# Patient Record
Sex: MALE | Employment: FULL TIME | URBAN - METROPOLITAN AREA
[De-identification: names, ages, dates, MRNs, and addresses within clinical notes are randomized per-mention and may not be internally consistent; named-entity substitution may affect disease eponyms.]

---

## 2022-06-23 ENCOUNTER — TRANSCRIBE ORDER (OUTPATIENT)
Dept: SCHEDULING | Age: 52
End: 2022-06-23

## 2022-06-23 DIAGNOSIS — Z13.6 ENCOUNTER FOR SPECIAL SCREENING EXAMINATION FOR CARDIOVASCULAR DISORDER: Primary | ICD-10-CM

## 2022-07-07 ENCOUNTER — HOSPITAL ENCOUNTER (OUTPATIENT)
Dept: CT IMAGING | Age: 52
Discharge: HOME | End: 2022-07-07
Attending: INTERNAL MEDICINE
Payer: SELF-PAY

## 2022-07-07 DIAGNOSIS — Z13.6 ENCOUNTER FOR SPECIAL SCREENING EXAMINATION FOR CARDIOVASCULAR DISORDER: ICD-10-CM

## 2022-07-07 PROCEDURE — 75571 CT HRT W/O DYE W/CA TEST: CPT

## 2022-07-22 ENCOUNTER — OFFICE VISIT (OUTPATIENT)
Dept: CARDIOLOGY CLINIC | Age: 52
End: 2022-07-22
Payer: COMMERCIAL

## 2022-07-22 VITALS
BODY MASS INDEX: 30.31 KG/M2 | SYSTOLIC BLOOD PRESSURE: 132 MMHG | OXYGEN SATURATION: 98 % | HEIGHT: 68 IN | HEART RATE: 64 BPM | WEIGHT: 200 LBS | DIASTOLIC BLOOD PRESSURE: 80 MMHG

## 2022-07-22 DIAGNOSIS — R93.1 ELEVATED CORONARY ARTERY CALCIUM SCORE: Primary | ICD-10-CM

## 2022-07-22 PROCEDURE — 93000 ELECTROCARDIOGRAM COMPLETE: CPT | Performed by: INTERNAL MEDICINE

## 2022-07-22 PROCEDURE — 99203 OFFICE O/P NEW LOW 30 MIN: CPT | Performed by: INTERNAL MEDICINE

## 2022-07-22 RX ORDER — LISINOPRIL 2.5 MG/1
10 TABLET ORAL DAILY
COMMUNITY
Start: 2022-04-30

## 2022-07-22 RX ORDER — LEVOTHYROXINE SODIUM 50 UG/1
TABLET ORAL
COMMUNITY
Start: 2022-04-28

## 2022-07-22 RX ORDER — PITAVASTATIN CALCIUM 4.18 MG/1
4 TABLET, FILM COATED ORAL DAILY
COMMUNITY
Start: 2022-04-30 | End: 2022-07-22 | Stop reason: ALTCHOICE

## 2022-07-22 RX ORDER — ROSUVASTATIN CALCIUM 10 MG/1
10 TABLET, COATED ORAL
Qty: 30 TABLET | Refills: 6 | Status: SHIPPED | OUTPATIENT
Start: 2022-07-22

## 2022-07-22 NOTE — LETTER
7/22/2022    Patient: Jerrell Rangel   YOB: 1970   Date of Visit: 7/22/2022     Neeraj Matt, [address] [facility] [address]  [place] [address]  Via Fax: [phone], [facility] [place]  [place] [address]  Via Fax: [phone]    Dear ADELSO Diams PA,      Thank you for referring Mr. Paris Marion to [address] [place] [address] for evaluation. My notes for this consultation are attached. If you have questions, please do not hesitate to call me. I look forward to following your patient along with you.       Sincerely,    Lyle Ruiz MD

## 2022-07-22 NOTE — PROGRESS NOTES
[facility]    Mr. Aundrea Lynn is 57-year-old male with a history of hypertension and hyperlipidemia    Patient is here today for cardiac evaluation. He denies any prior history of MI or CHF. Patient recently had a calcium coronary is score is a screening. Score was elevated so he was asked to come see me. He denies any chest pain or chest tightness concerning for angina. He does have random episode of shortness of breath however does not appear to be exertional.  He is able to perform activity daily living without any limitation. He denies presyncope or syncope. No palpitation. Denies any nausea, vomiting, abdominal pain, fever, chills, sputum production. No hematuria or other bleeding complaints    Past Medical History:   Diagnosis Date    Essential hypertension     Hyperchloremia     Hypertension     Kidney stone        Review of Systems:  Cardiac symptoms as noted above in HPI. All others negative. Denies fatigue, malaise, skin rash, joint pain, blurring vision, photophobia, neck pain, hemoptysis, chronic cough, nausea, vomiting, hematuria, burning micturition, BRBPR, chronic headaches. Current Outpatient Medications   Medication Sig    levothyroxine (SYNTHROID) 50 mcg tablet TAKE 1 TABLET BY MOUTH EVERY DAY ON EMPTY STOMACH 30 MIN BEFORE FOOD    lisinopriL (PRINIVIL, ZESTRIL) 2.5 mg tablet Take 10 mg by mouth in the morning. No current facility-administered medications for this visit. No past surgical history on file. Allergies and Sensitivities:  No Known Allergies    Family History:  No family history on file. Social History:  Social History     Tobacco Use    Smoking status: Never    Smokeless tobacco: Never   Substance Use Topics    Alcohol use: Yes     Alcohol/week: 0.0 standard drinks     Types: 12 - 14 Cans of beer per week    Drug use: No     He  reports that he has never smoked.  He has never used smokeless tobacco.  He  reports current alcohol use. Physical Exam:  BP Readings from Last 3 Encounters:   22 132/80   13 120/70   13 118/68         Pulse Readings from Last 3 Encounters:   22 64   13 72   13 70          Wt Readings from Last 3 Encounters:   22 90.7 kg (200 lb)   13 81.6 kg (180 lb)   13 81.6 kg (180 lb)       Constitutional: Oriented to person, place, and time. HENT: Head: Normocephalic and atraumatic. Eyes: Conjunctivae and extraocular motions are normal.   Neck: No JVD present. Carotid bruit is not appreciated. Cardiovascular: Regular rhythm. No murmur, gallop or rubs appreciated  Lung: Breath sounds normal. No respiratory distress. No ronchi or rales appreciated  Abdominal: No tenderness. No rebound and no guarding. Musculoskeletal: There is no lower extremity edema. No cynosis  Lymphadenopathy:  No cervical or supraclavicular adenopathy appriciated. Neurological: No gross motor deficit noted. Skin: No visible skin rash noted. No Ear discharge noted  Psychiatric: Normal mood and affect. LABS:   @  Lab Results   Component Value Date/Time    WBC 10.3 2013 04:20 PM    HGB 13.3 2013 04:20 PM    HCT 39.1 2013 04:20 PM    PLATELET 309  04:20 PM    MCV 88.3 2013 04:20 PM     Lab Results   Component Value Date/Time    Sodium 143 2013 04:20 PM    Potassium 3.9 2013 04:20 PM    Chloride 103 2013 04:20 PM    CO2 31 2013 04:20 PM    Glucose 112 (H) 2013 04:20 PM    BUN 19 (H) 2013 04:20 PM    Creatinine 1.20 2013 04:20 PM     No flowsheet data found. Lab Results   Component Value Date/Time    ALT (SGPT) 34 2013 04:20 PM     No results found for: HBA1C, WYB7PDPY, YMB7FBAT  No results found for: TSH, TSH2, TSH3, TSHP, TSHEXT    EK2022: Sinus rhythm at 64 bpm.  Normal VT and QRS interval.  No ST-T changes of ischemia.     STRESS TEST (EST, PHARM, NUC, ECHO etc)    CATHETERIZATION    IMPRESSION & PLAN:  Mr. David Felix is 47-year-old male    Hypertension:  /80 mmHg. Currently on lisinopril 10 mg daily. Will order echo to rule out hypertensive cardiovascular heart disease  Salt restriction, dietary modification, weight loss and other nonpharmacologic intervention discussed    Hyperlipidemia: This is being managed by PCP  Currently on Livalo 1 mg daily. Last lipid profile on 7/18/2022 showed total cholesterol 186, HDL 38,   Considering elevated calcium coronary score, I am going to try to discontinue Livalo and try again Crestor 10 mg daily. Patient had a myalgia and joint discomfort with use of atorvastatin and Crestor very long time ago. He is willing to try again. Abnormal coronary calcium score:  Patient had a CT calcium scoring which showed score of 98, LAD 63 and RCA 35  Currently without any angina. Random episode of dyspnea. We will check echo to rule out hypertensive cardiovascular disease  Will proceed with treadmill stress test to evaluate for any possible ischemic changes on EKG. Importance of diet and exercise was discussed with patient. This plan was discussed with patient who is in agreement. Thank you for allowing me to participate in patient care. Please feel free to call me if you have any question or concern. Diane Dawson MD  Please note: This document has been produced using voice recognition software. Unrecognized errors in transcription may be present.

## 2022-09-08 ENCOUNTER — TELEPHONE (OUTPATIENT)
Dept: CARDIOLOGY CLINIC | Age: 52
End: 2022-09-08

## 2023-01-11 RX ORDER — ROSUVASTATIN CALCIUM 10 MG/1
10 TABLET, COATED ORAL
Qty: 90 TABLET | Refills: 2 | Status: SHIPPED | OUTPATIENT
Start: 2023-01-11

## 2023-04-24 ENCOUNTER — OFFICE VISIT (OUTPATIENT)
Age: 53
End: 2023-04-24
Payer: COMMERCIAL

## 2023-04-24 ENCOUNTER — HOSPITAL ENCOUNTER (OUTPATIENT)
Facility: HOSPITAL | Age: 53
Discharge: HOME | End: 2023-04-27

## 2023-04-24 VITALS
HEIGHT: 68 IN | OXYGEN SATURATION: 98 % | WEIGHT: 204 LBS | HEART RATE: 61 BPM | SYSTOLIC BLOOD PRESSURE: 128 MMHG | BODY MASS INDEX: 30.92 KG/M2 | DIASTOLIC BLOOD PRESSURE: 74 MMHG

## 2023-04-24 DIAGNOSIS — R93.1 ABNORMAL FINDINGS ON DIAGNOSTIC IMAGING OF HEART AND CORONARY CIRCULATION: Primary | ICD-10-CM

## 2023-04-24 DIAGNOSIS — R93.1 ABNORMAL FINDINGS ON DIAGNOSTIC IMAGING OF HEART AND CORONARY CIRCULATION: ICD-10-CM

## 2023-04-24 LAB — SENTARA SPECIMEN COLLECTION: NORMAL

## 2023-04-24 PROCEDURE — 99001 SPECIMEN HANDLING PT-LAB: CPT

## 2023-04-24 PROCEDURE — 99213 OFFICE O/P EST LOW 20 MIN: CPT | Performed by: INTERNAL MEDICINE

## 2023-04-24 RX ORDER — LISINOPRIL 10 MG/1
10 TABLET ORAL DAILY
COMMUNITY
Start: 2023-03-01

## 2023-04-24 NOTE — PROGRESS NOTES
[facility]    Edwin Sanchez is 45 y.o. male with a history of hypertension and hyperlipidemia    Patient is here today for follow up. Denies any resting or exertional chest pain or chest pressure to suggest angina or any dyspnea to suggest heart failure. No presyncope or syncope  Denies any PND or LE edema. Taking all medications regularly. Denies any nausea, vomiting, abdominal pain, fever, chills, sputum production. No hematuria or other bleeding complaints      Past Medical History:   Diagnosis Date    Essential hypertension     Hyperchloremia     Hypertension     Kidney stone        Review of Systems:  Cardiac symptoms as noted above in HPI. All others negative. Denies fatigue, malaise, skin rash, joint pain, blurring vision, photophobia, neck pain, hemoptysis, chronic cough, nausea, vomiting, hematuria, burning micturition, BRBPR, chronic headaches. Current Outpatient Medications   Medication Sig    lisinopril (PRINIVIL;ZESTRIL) 10 MG tablet Take 1 tablet by mouth daily    levothyroxine (SYNTHROID) 50 MCG tablet TAKE 1 TABLET BY MOUTH EVERY DAY ON EMPTY STOMACH 30 MIN BEFORE FOOD    rosuvastatin (CRESTOR) 10 MG tablet Take 1 tablet by mouth     No current facility-administered medications for this visit. No past surgical history on file. Allergies and Sensitivities:  No Known Allergies    Family History:  No family history on file. Social History:  Social History     Tobacco Use    Smoking status: Never    Smokeless tobacco: Never   Substance Use Topics    Alcohol use: Yes     Alcohol/week: 0.0 standard drinks    Drug use: No     He  reports that he has never smoked. He has never used smokeless tobacco.  He  reports current alcohol use.     Physical Exam:  BP Readings from Last 3 Encounters:   04/24/23 128/74   09/09/22 132/80   07/22/22 132/80         Pulse Readings from Last 3 Encounters:   04/24/23 61

## 2023-04-25 LAB
CHOLESTEROL/HDL RATIO: 4 (ref 0–5)
CHOLESTEROL: 183 MG/DL (ref 110–200)
HDLC SERPL-MCNC: 46 MG/DL
LDL CHOLESTEROL CALCULATED: 104 MG/DL (ref 50–99)
LDL/HDL RATIO: 2.3
NON-HDL CHOLESTEROL: 137 MG/DL
TRIGL SERPL-MCNC: 163 MG/DL (ref 40–149)
VLDLC SERPL CALC-MCNC: 33 MG/DL (ref 8–30)

## 2024-04-29 ENCOUNTER — OFFICE VISIT (OUTPATIENT)
Age: 54
End: 2024-04-29
Payer: COMMERCIAL

## 2024-04-29 VITALS
DIASTOLIC BLOOD PRESSURE: 90 MMHG | WEIGHT: 204 LBS | SYSTOLIC BLOOD PRESSURE: 120 MMHG | OXYGEN SATURATION: 98 % | HEART RATE: 73 BPM | BODY MASS INDEX: 31.02 KG/M2

## 2024-04-29 DIAGNOSIS — E78.00 PURE HYPERCHOLESTEROLEMIA: ICD-10-CM

## 2024-04-29 DIAGNOSIS — I10 ESSENTIAL HYPERTENSION WITH GOAL BLOOD PRESSURE LESS THAN 140/90: Primary | ICD-10-CM

## 2024-04-29 PROCEDURE — 3080F DIAST BP >= 90 MM HG: CPT | Performed by: INTERNAL MEDICINE

## 2024-04-29 PROCEDURE — 3074F SYST BP LT 130 MM HG: CPT | Performed by: INTERNAL MEDICINE

## 2024-04-29 PROCEDURE — 99213 OFFICE O/P EST LOW 20 MIN: CPT | Performed by: INTERNAL MEDICINE

## 2024-04-29 NOTE — PROGRESS NOTES
heart rate recovery was normal. The patient reported no symptoms during the stress test.    IMPRESSION & PLAN:  Marcelo Topete  is 53 y.o. male with    Hypertension: /84.  mmHg.  Currently on lisinopril 10 mg daily.  Salt restriction, weight loss discussed    Hyperlipidemia:  This is being managed by PCP  Was on Livalo 1 mg daily.  Last lipid profile on 7/18/2022 showed total cholesterol  186, HDL 38,   Changed to crestor in 2022.   Last LDL in 04/2023 was 104.     Abnormal coronary calcium score:  Patient had a CT calcium scoring which showed score of 98, LAD 63 and RCA 35  Currently without any angina.   Echo and EST in 09/2022, low risk  Continue aggressive risk factor modification.     Currently he does not report any symptom that is concerning for angina or heart failure    This plan was discussed with patient who is in agreement.    Thank you for allowing me to participate in patient care. Please feel free to call me if you have any question or concern.     Mikael Topete MD  Please note: This document has been produced using voice recognition software. Unrecognized errors in transcription may be present.

## 2025-05-05 ENCOUNTER — OFFICE VISIT (OUTPATIENT)
Age: 55
End: 2025-05-05
Payer: COMMERCIAL

## 2025-05-05 VITALS
WEIGHT: 200.4 LBS | SYSTOLIC BLOOD PRESSURE: 134 MMHG | BODY MASS INDEX: 30.37 KG/M2 | HEART RATE: 65 BPM | HEIGHT: 68 IN | OXYGEN SATURATION: 97 % | DIASTOLIC BLOOD PRESSURE: 90 MMHG

## 2025-05-05 DIAGNOSIS — I10 HYPERTENSION, UNSPECIFIED TYPE: ICD-10-CM

## 2025-05-05 DIAGNOSIS — I10 HYPERTENSION, UNSPECIFIED TYPE: Primary | ICD-10-CM

## 2025-05-05 PROCEDURE — 3080F DIAST BP >= 90 MM HG: CPT | Performed by: INTERNAL MEDICINE

## 2025-05-05 PROCEDURE — 3075F SYST BP GE 130 - 139MM HG: CPT | Performed by: INTERNAL MEDICINE

## 2025-05-05 PROCEDURE — 99214 OFFICE O/P EST MOD 30 MIN: CPT | Performed by: INTERNAL MEDICINE

## 2025-05-05 PROCEDURE — 93000 ELECTROCARDIOGRAM COMPLETE: CPT | Performed by: INTERNAL MEDICINE

## 2025-05-05 RX ORDER — LISINOPRIL 10 MG/1
10 TABLET ORAL 2 TIMES DAILY
Qty: 180 TABLET | Refills: 3 | Status: SHIPPED | OUTPATIENT
Start: 2025-05-05

## 2025-05-05 RX ORDER — ROSUVASTATIN CALCIUM 20 MG/1
20 TABLET, COATED ORAL NIGHTLY
Qty: 90 TABLET | Refills: 3 | Status: SHIPPED | OUTPATIENT
Start: 2025-05-05

## 2025-05-05 NOTE — PROGRESS NOTES
[facility]    Marcelo Meza LIEN Topete is 54 y.o. male with a history of hypertension and hyperlipidemia    Patient is here today for follow up.   Denies any resting or exertional chest pain or chest pressure to suggest angina or any dyspnea to suggest heart failure.   Patient exercise regularly.  He walks on a treadmill for almost 40 minutes at 5 incline at a speed of 3 without any cardiac symptoms  He also goes to gym and does cardio exercise without any significant symptoms  No presyncope or syncope  Denies any PND or LE edema.   Denies any nausea, vomiting, abdominal pain, fever, chills, sputum production. No hematuria or other bleeding complaints      Past Medical History:   Diagnosis Date    Essential hypertension     Hyperchloremia     Hypertension     Kidney stone        Review of Systems:  Cardiac symptoms as noted above in HPI. All others negative.    Current Outpatient Medications   Medication Sig    lisinopril (PRINIVIL;ZESTRIL) 10 MG tablet Take 1 tablet by mouth daily    levothyroxine (SYNTHROID) 50 MCG tablet TAKE 1 TABLET BY MOUTH EVERY DAY ON EMPTY STOMACH 30 MIN BEFORE FOOD    rosuvastatin (CRESTOR) 10 MG tablet Take 1 tablet by mouth     No current facility-administered medications for this visit.       No past surgical history on file.    Allergies and Sensitivities:  No Known Allergies    Family History:  No family history on file.    Social History:  Social History     Tobacco Use    Smoking status: Never    Smokeless tobacco: Never   Substance Use Topics    Alcohol use: Yes     Alcohol/week: 0.0 standard drinks of alcohol    Drug use: No     He  reports that he has never smoked. He has never used smokeless tobacco.  He  reports current alcohol use.    Physical Exam:  BP Readings from Last 3 Encounters:   05/05/25 (!) 134/90   04/29/24 (!) 120/90   04/24/23 128/74         Pulse Readings from Last 3 Encounters:   05/05/25 65